# Patient Record
Sex: MALE | Race: OTHER | Employment: OTHER | URBAN - METROPOLITAN AREA
[De-identification: names, ages, dates, MRNs, and addresses within clinical notes are randomized per-mention and may not be internally consistent; named-entity substitution may affect disease eponyms.]

---

## 2017-07-31 ENCOUNTER — OFFICE VISIT (OUTPATIENT)
Dept: FAMILY MEDICINE CLINIC | Facility: CLINIC | Age: 72
End: 2017-07-31

## 2017-07-31 ENCOUNTER — TELEPHONE (OUTPATIENT)
Dept: FAMILY MEDICINE CLINIC | Facility: CLINIC | Age: 72
End: 2017-07-31

## 2017-07-31 VITALS
DIASTOLIC BLOOD PRESSURE: 75 MMHG | HEART RATE: 83 BPM | WEIGHT: 122 LBS | TEMPERATURE: 98 F | SYSTOLIC BLOOD PRESSURE: 113 MMHG

## 2017-07-31 DIAGNOSIS — R35.1 BENIGN PROSTATIC HYPERPLASIA WITH NOCTURIA: ICD-10-CM

## 2017-07-31 DIAGNOSIS — I48.20 CHRONIC ATRIAL FIBRILLATION (HCC): Primary | ICD-10-CM

## 2017-07-31 DIAGNOSIS — N40.1 BENIGN PROSTATIC HYPERPLASIA WITH NOCTURIA: ICD-10-CM

## 2017-07-31 DIAGNOSIS — M05.79 RHEUMATOID ARTHRITIS INVOLVING MULTIPLE SITES WITH POSITIVE RHEUMATOID FACTOR (HCC): ICD-10-CM

## 2017-07-31 PROCEDURE — 99204 OFFICE O/P NEW MOD 45 MIN: CPT | Performed by: FAMILY MEDICINE

## 2017-07-31 PROCEDURE — 93005 ELECTROCARDIOGRAM TRACING: CPT | Performed by: FAMILY MEDICINE

## 2017-07-31 PROCEDURE — 99212 OFFICE O/P EST SF 10 MIN: CPT | Performed by: FAMILY MEDICINE

## 2017-07-31 PROCEDURE — 93010 ELECTROCARDIOGRAM REPORT: CPT | Performed by: FAMILY MEDICINE

## 2017-07-31 RX ORDER — PREDNISONE 1 MG/1
5 TABLET ORAL DAILY
Qty: 30 TABLET | Refills: 1 | Status: SHIPPED | OUTPATIENT
Start: 2017-07-31 | End: 2017-08-01

## 2017-07-31 RX ORDER — AMIODARONE HYDROCHLORIDE 200 MG/1
200 TABLET ORAL DAILY
Qty: 90 TABLET | Refills: 1 | Status: SHIPPED | OUTPATIENT
Start: 2017-07-31 | End: 2017-11-08

## 2017-07-31 RX ORDER — TAMSULOSIN HYDROCHLORIDE 0.4 MG/1
0.4 CAPSULE ORAL DAILY
Qty: 90 CAPSULE | Refills: 1 | Status: SHIPPED | OUTPATIENT
Start: 2017-07-31 | End: 2017-08-30

## 2017-07-31 RX ORDER — AMITRIPTYLINE HYDROCHLORIDE 10 MG/1
10 TABLET, FILM COATED ORAL NIGHTLY
Qty: 90 TABLET | Refills: 1 | Status: SHIPPED | OUTPATIENT
Start: 2017-07-31 | End: 2017-11-27

## 2017-07-31 RX ORDER — FINASTERIDE 5 MG/1
5 TABLET, FILM COATED ORAL DAILY
Qty: 90 TABLET | Refills: 1 | Status: SHIPPED | OUTPATIENT
Start: 2017-07-31 | End: 2017-11-27

## 2017-07-31 NOTE — TELEPHONE ENCOUNTER
Pharm is calling state that the medication prednisoLONE 5 MG Oral Tab they don't have in generic and the brand name is very expensive  Pharm want to know if Dr Kasandra Eubanks want to change to something else

## 2017-07-31 NOTE — PROGRESS NOTES
7/31/2017  1:50 PM    Ari Carrasco is a 67year old male. Chief complaint(s): Patient presents with:  Medication Request    HPI:     Ari Carrasco primary complaint is regarding multiple issues .      In regard to the atrial fibril Patient complaince with his treatment has beengood. Patient has had no previous prostate surgery . The severity of symptoms for now remains mild. BPH symptom score: Total BPH symptom score is 8 ( 8-19 (moderate symptom score)). His last PSA was ? [employer]   Constitutional: Negative for chills, fatigue and fever. HENT: Negative for rhinorrhea and sore throat. Respiratory: Negative for cough, shortness of breath and wheezing. Cardiovascular: Negative for chest pain and palpitations.    Luther Lu atrial fibrillation (hcc)  (primary encounter diagnosis)  Rheumatoid arthritis involving multiple sites with positive rheumatoid factor (hcc)  Benign prostatic hyperplasia with nocturia    1.  Chronic atrial fibrillation [facility])  MEDICATIONS:     Signed Presc Oral Cap 90 capsule 1      Sig: Take 1 capsule (0.4 mg total) by mouth daily. finasteride 5 MG Oral Tab 90 tablet 1      Sig: Take 1 tablet (5 mg total) by mouth daily.       aspirin 81 MG Oral Tab 90 tablet 3      Sig: Take 1 tablet (81 mg total) by m (2.5 mg total) by mouth 2 (two) times daily.               LABORATORY & ORDERS:   Orders Placed This Encounter      ** Hemoglobin A1C  [E]      ** Lipid Panel [E]      **CMP  Comp Metabolic Panel (14) [E]      **CBC W Differential W Platelet [E]      PSA, T

## 2017-08-01 RX ORDER — PREDNISONE 1 MG/1
5 TABLET ORAL DAILY
Qty: 30 TABLET | Refills: 1 | Status: SHIPPED | OUTPATIENT
Start: 2017-08-01 | End: 2017-08-31

## 2017-08-01 NOTE — TELEPHONE ENCOUNTER
Contacted by Edwina Greene in [address] [place] [address], requesting change prednisolone to prednisone. Prescription was changed 7/31/17 and was sent to [facility] in error. erx sent to [facility] and they will contact [facility] to cancel the order.  Profile updated, p

## 2017-08-01 NOTE — TELEPHONE ENCOUNTER
Pharmacy calling to inquire on status. Appears to have been approved. Needs to be sent to Archana Abraham in [address] [place] [address].

## 2017-08-24 ENCOUNTER — PRIOR ORIGINAL RECORDS (OUTPATIENT)
Dept: OTHER | Age: 72
End: 2017-08-24

## 2017-08-24 ENCOUNTER — LAB ENCOUNTER (OUTPATIENT)
Dept: LAB | Facility: HOSPITAL | Age: 72
End: 2017-08-24
Attending: INTERNAL MEDICINE
Payer: MEDICARE

## 2017-08-24 DIAGNOSIS — Z82.49 FAMILY HISTORY OF EARLY CAD: ICD-10-CM

## 2017-08-24 DIAGNOSIS — I48.91 ATRIAL FIBRILLATION (HCC): Primary | ICD-10-CM

## 2017-08-24 LAB
ANION GAP SERPL CALC-SCNC: 8 MMOL/L (ref 0–18)
BASOPHILS # BLD: 0 K/UL (ref 0–0.2)
BASOPHILS NFR BLD: 0 %
BUN SERPL-MCNC: 19 MG/DL (ref 8–20)
BUN/CREAT SERPL: 15.2 (ref 10–20)
CALCIUM SERPL-MCNC: 9.1 MG/DL (ref 8.5–10.5)
CHLORIDE SERPL-SCNC: 102 MMOL/L (ref 95–110)
CHOLEST SERPL-MCNC: 176 MG/DL (ref 110–200)
CO2 SERPL-SCNC: 27 MMOL/L (ref 22–32)
CREAT SERPL-MCNC: 1.25 MG/DL (ref 0.5–1.5)
EOSINOPHIL # BLD: 0.1 K/UL (ref 0–0.7)
EOSINOPHIL NFR BLD: 2 %
ERYTHROCYTE [DISTWIDTH] IN BLOOD BY AUTOMATED COUNT: 14.5 % (ref 11–15)
GLUCOSE SERPL-MCNC: 81 MG/DL (ref 70–99)
HCT VFR BLD AUTO: 40.4 % (ref 41–52)
HDLC SERPL-MCNC: 71 MG/DL
HGB BLD-MCNC: 13.2 G/DL (ref 13.5–17.5)
LDLC SERPL CALC-MCNC: 92 MG/DL (ref 0–99)
LYMPHOCYTES # BLD: 0.8 K/UL (ref 1–4)
LYMPHOCYTES NFR BLD: 10 %
MAGNESIUM SERPL-MCNC: 2.2 MG/DL (ref 1.8–2.5)
MCH RBC QN AUTO: 31.1 PG (ref 27–32)
MCHC RBC AUTO-ENTMCNC: 32.7 G/DL (ref 32–37)
MCV RBC AUTO: 94.9 FL (ref 80–100)
MONOCYTES # BLD: 0.7 K/UL (ref 0–1)
MONOCYTES NFR BLD: 9 %
NEUTROPHILS # BLD AUTO: 6.1 K/UL (ref 1.8–7.7)
NEUTROPHILS NFR BLD: 79 %
NONHDLC SERPL-MCNC: 105 MG/DL
OSMOLALITY UR CALC.SUM OF ELEC: 285 MOSM/KG (ref 275–295)
PLATELET # BLD AUTO: 172 K/UL (ref 140–400)
PMV BLD AUTO: 8.4 FL (ref 7.4–10.3)
POTASSIUM SERPL-SCNC: 4.1 MMOL/L (ref 3.3–5.1)
RBC # BLD AUTO: 4.26 M/UL (ref 4.5–5.9)
SODIUM SERPL-SCNC: 137 MMOL/L (ref 136–144)
TRIGL SERPL-MCNC: 66 MG/DL (ref 1–149)
TSH SERPL-ACNC: 1.5 UIU/ML (ref 0.45–5.33)
WBC # BLD AUTO: 7.7 K/UL (ref 4–11)

## 2017-08-24 PROCEDURE — 80048 BASIC METABOLIC PNL TOTAL CA: CPT

## 2017-08-24 PROCEDURE — 85025 COMPLETE CBC W/AUTO DIFF WBC: CPT

## 2017-08-24 PROCEDURE — 36415 COLL VENOUS BLD VENIPUNCTURE: CPT

## 2017-08-24 PROCEDURE — 84443 ASSAY THYROID STIM HORMONE: CPT

## 2017-08-24 PROCEDURE — 83735 ASSAY OF MAGNESIUM: CPT

## 2017-08-24 PROCEDURE — 80061 LIPID PANEL: CPT

## 2017-08-30 ENCOUNTER — PRIOR ORIGINAL RECORDS (OUTPATIENT)
Dept: OTHER | Age: 72
End: 2017-08-30

## 2017-08-31 ENCOUNTER — OFFICE VISIT (OUTPATIENT)
Dept: FAMILY MEDICINE CLINIC | Facility: CLINIC | Age: 72
End: 2017-08-31

## 2017-08-31 VITALS
HEART RATE: 91 BPM | DIASTOLIC BLOOD PRESSURE: 61 MMHG | SYSTOLIC BLOOD PRESSURE: 96 MMHG | TEMPERATURE: 98 F | HEIGHT: 66 IN | BODY MASS INDEX: 20.03 KG/M2 | WEIGHT: 124.63 LBS

## 2017-08-31 DIAGNOSIS — M05.79 RHEUMATOID ARTHRITIS INVOLVING MULTIPLE SITES WITH POSITIVE RHEUMATOID FACTOR (HCC): ICD-10-CM

## 2017-08-31 DIAGNOSIS — I48.20 CHRONIC ATRIAL FIBRILLATION (HCC): Primary | ICD-10-CM

## 2017-08-31 DIAGNOSIS — M43.6 TORTICOLLIS, ACUTE: ICD-10-CM

## 2017-08-31 PROCEDURE — G0463 HOSPITAL OUTPT CLINIC VISIT: HCPCS | Performed by: FAMILY MEDICINE

## 2017-08-31 PROCEDURE — 99214 OFFICE O/P EST MOD 30 MIN: CPT | Performed by: FAMILY MEDICINE

## 2017-08-31 RX ORDER — METAXALONE 800 MG/1
800 TABLET ORAL 3 TIMES DAILY
Qty: 30 TABLET | Refills: 1 | Status: SHIPPED | OUTPATIENT
Start: 2017-08-31 | End: 2017-10-16

## 2017-08-31 RX ORDER — TAMSULOSIN HYDROCHLORIDE 0.4 MG/1
0.4 CAPSULE ORAL DAILY
COMMUNITY
End: 2017-08-31

## 2017-08-31 RX ORDER — TAMSULOSIN HYDROCHLORIDE 0.4 MG/1
0.4 CAPSULE ORAL DAILY
Qty: 60 CAPSULE | Refills: 4 | Status: SHIPPED | OUTPATIENT
Start: 2017-08-31

## 2017-08-31 RX ORDER — PREDNISONE 1 MG/1
5 TABLET ORAL DAILY
Qty: 30 TABLET | Refills: 1 | Status: SHIPPED | OUTPATIENT
Start: 2017-08-31 | End: 2017-11-27

## 2017-08-31 NOTE — PROGRESS NOTES
8/31/2017  11:04 AM    Shahla Stoll is a 67year old male. Chief complaint(s): Patient presents with:   Follow - Up: Patient here to f/u from his last visit regarding his Afib    HPI:     Nato [place] primary complaint is regardin Medical History:   Diagnosis Date   • Atrial fibrillation (HCC)    • BPH (benign prostatic hyperplasia)    • Diverticulosis    • Rheumatoid arteritis       Past Surgical History:  No date: INTESTINE SURG PROCEDURE UNLISTED   Family History   Problem Relati Musculoskeletal: Positive for joint swelling, joint pain, neck pain and neck stiffness. Negative for back pain. Skin: Negative for rash. Neurological: Negative for seizures and headaches. Psychiatric/Behavioral: Negative for depressed mood.        P 1. 50 0.45 - 5.33 uIU/mL   -LIPID PANEL   Result Value Ref Range   HDL Cholesterol 71 mg/dL   Cholesterol, Total 176 110 - 200 mg/dL   Triglycerides 66 1 - 149 mg/dL   Non HDL Chol 105 <130 mg/dL   LDL Cholesterol 92 0 - 99 mg/dL   -CBC W/ DIFFERENTIAL   Re mg total) by mouth daily. Metaxalone (SKELAXIN) 800 MG Oral Tab 30 tablet 1      Sig: Take 1 tablet (800 mg total) by mouth 3 (three) times daily. REFERRALS: 1RHEUMATOLOGY - INTERNAL,       Rheumatology Referral - [facility]).

## 2017-09-11 ENCOUNTER — PRIOR ORIGINAL RECORDS (OUTPATIENT)
Dept: OTHER | Age: 72
End: 2017-09-11

## 2017-09-20 ENCOUNTER — HOSPITAL ENCOUNTER (OUTPATIENT)
Dept: GENERAL RADIOLOGY | Age: 72
Discharge: HOME | End: 2017-09-20
Attending: INTERNAL MEDICINE
Payer: MEDICARE

## 2017-09-20 ENCOUNTER — LAB ENCOUNTER (OUTPATIENT)
Dept: LAB | Age: 72
End: 2017-09-20
Attending: INTERNAL MEDICINE
Payer: MEDICARE

## 2017-09-20 ENCOUNTER — OFFICE VISIT (OUTPATIENT)
Dept: RHEUMATOLOGY | Facility: CLINIC | Age: 72
End: 2017-09-20

## 2017-09-20 ENCOUNTER — PRIOR ORIGINAL RECORDS (OUTPATIENT)
Dept: OTHER | Age: 72
End: 2017-09-20

## 2017-09-20 ENCOUNTER — HOSPITAL ENCOUNTER (OUTPATIENT)
Dept: GENERAL RADIOLOGY | Age: 72
Discharge: HOME | End: 2017-09-20
Attending: INTERNAL MEDICINE | Admitting: INTERNAL MEDICINE
Payer: MEDICARE

## 2017-09-20 VITALS
SYSTOLIC BLOOD PRESSURE: 97 MMHG | HEART RATE: 102 BPM | BODY MASS INDEX: 19.67 KG/M2 | HEIGHT: 66 IN | DIASTOLIC BLOOD PRESSURE: 62 MMHG | WEIGHT: 122.38 LBS

## 2017-09-20 DIAGNOSIS — M06.9 RHEUMATOID ARTHRITIS INVOLVING MULTIPLE SITES, UNSPECIFIED RHEUMATOID FACTOR PRESENCE: ICD-10-CM

## 2017-09-20 DIAGNOSIS — R35.1 BENIGN PROSTATIC HYPERPLASIA WITH NOCTURIA: ICD-10-CM

## 2017-09-20 DIAGNOSIS — R06.00 DYSPNEA ON EXERTION: ICD-10-CM

## 2017-09-20 DIAGNOSIS — I48.20 CHRONIC ATRIAL FIBRILLATION (HCC): ICD-10-CM

## 2017-09-20 DIAGNOSIS — M05.79 RHEUMATOID ARTHRITIS INVOLVING MULTIPLE SITES WITH POSITIVE RHEUMATOID FACTOR (HCC): ICD-10-CM

## 2017-09-20 DIAGNOSIS — N40.1 BENIGN PROSTATIC HYPERPLASIA WITH NOCTURIA: ICD-10-CM

## 2017-09-20 DIAGNOSIS — M06.9 RHEUMATOID ARTHRITIS INVOLVING MULTIPLE SITES, UNSPECIFIED RHEUMATOID FACTOR PRESENCE: Primary | ICD-10-CM

## 2017-09-20 LAB
ALBUMIN SERPL BCP-MCNC: 3.5 G/DL (ref 3.5–4.8)
ALBUMIN/GLOB SERPL: 1.1 {RATIO} (ref 1–2)
ALP SERPL-CCNC: 59 U/L (ref 32–100)
ALT SERPL-CCNC: 10 U/L (ref 17–63)
ANION GAP SERPL CALC-SCNC: 7 MMOL/L (ref 0–18)
AST SERPL-CCNC: 14 U/L (ref 15–41)
BASOPHILS # BLD: 0 K/UL (ref 0–0.2)
BASOPHILS NFR BLD: 1 %
BILIRUB SERPL-MCNC: 0.6 MG/DL (ref 0.3–1.2)
BUN SERPL-MCNC: 19 MG/DL (ref 8–20)
BUN/CREAT SERPL: 14.2 (ref 10–20)
CALCIUM SERPL-MCNC: 8.9 MG/DL (ref 8.5–10.5)
CHLORIDE SERPL-SCNC: 103 MMOL/L (ref 95–110)
CHOLEST SERPL-MCNC: 153 MG/DL (ref 110–200)
CO2 SERPL-SCNC: 28 MMOL/L (ref 22–32)
CREAT SERPL-MCNC: 1.34 MG/DL (ref 0.5–1.5)
CRP SERPL-MCNC: 0.5 MG/DL (ref 0–0.9)
EOSINOPHIL # BLD: 0.1 K/UL (ref 0–0.7)
EOSINOPHIL NFR BLD: 2 %
ERYTHROCYTE [DISTWIDTH] IN BLOOD BY AUTOMATED COUNT: 14.9 % (ref 11–15)
ERYTHROCYTE [SEDIMENTATION RATE] IN BLOOD: 13 MM/HR (ref 0–20)
GLOBULIN PLAS-MCNC: 3.1 G/DL (ref 2.5–3.7)
GLUCOSE SERPL-MCNC: 79 MG/DL (ref 70–99)
HBA1C MFR BLD: 5.4 % (ref 4–6)
HCT VFR BLD AUTO: 40.8 % (ref 41–52)
HDLC SERPL-MCNC: 71 MG/DL
HGB BLD-MCNC: 13.4 G/DL (ref 13.5–17.5)
LDLC SERPL CALC-MCNC: 67 MG/DL (ref 0–99)
LYMPHOCYTES # BLD: 0.9 K/UL (ref 1–4)
LYMPHOCYTES NFR BLD: 15 %
MCH RBC QN AUTO: 31.1 PG (ref 27–32)
MCHC RBC AUTO-ENTMCNC: 32.8 G/DL (ref 32–37)
MCV RBC AUTO: 95 FL (ref 80–100)
MONOCYTES # BLD: 0.6 K/UL (ref 0–1)
MONOCYTES NFR BLD: 10 %
NEUTROPHILS # BLD AUTO: 4.3 K/UL (ref 1.8–7.7)
NEUTROPHILS NFR BLD: 74 %
NONHDLC SERPL-MCNC: 82 MG/DL
OSMOLALITY UR CALC.SUM OF ELEC: 287 MOSM/KG (ref 275–295)
PLATELET # BLD AUTO: 210 K/UL (ref 140–400)
PMV BLD AUTO: 8.3 FL (ref 7.4–10.3)
POTASSIUM SERPL-SCNC: 3.8 MMOL/L (ref 3.3–5.1)
PROT SERPL-MCNC: 6.6 G/DL (ref 5.9–8.4)
PSA SERPL-MCNC: 0.4 NG/ML (ref 0–4)
RBC # BLD AUTO: 4.3 M/UL (ref 4.5–5.9)
RHEUMATOID FACT SER QL: 59 IU/ML
SODIUM SERPL-SCNC: 138 MMOL/L (ref 136–144)
TRIGL SERPL-MCNC: 74 MG/DL (ref 1–149)
WBC # BLD AUTO: 5.9 K/UL (ref 4–11)

## 2017-09-20 PROCEDURE — G0463 HOSPITAL OUTPT CLINIC VISIT: HCPCS | Performed by: INTERNAL MEDICINE

## 2017-09-20 PROCEDURE — 80061 LIPID PANEL: CPT

## 2017-09-20 PROCEDURE — 73130 X-RAY EXAM OF HAND: CPT | Performed by: INTERNAL MEDICINE

## 2017-09-20 PROCEDURE — 84153 ASSAY OF PSA TOTAL: CPT

## 2017-09-20 PROCEDURE — 36415 COLL VENOUS BLD VENIPUNCTURE: CPT

## 2017-09-20 PROCEDURE — 83036 HEMOGLOBIN GLYCOSYLATED A1C: CPT

## 2017-09-20 PROCEDURE — 80053 COMPREHEN METABOLIC PANEL: CPT

## 2017-09-20 PROCEDURE — 86431 RHEUMATOID FACTOR QUANT: CPT

## 2017-09-20 PROCEDURE — 85025 COMPLETE CBC W/AUTO DIFF WBC: CPT

## 2017-09-20 PROCEDURE — 73630 X-RAY EXAM OF FOOT: CPT | Performed by: INTERNAL MEDICINE

## 2017-09-20 PROCEDURE — 99204 OFFICE O/P NEW MOD 45 MIN: CPT | Performed by: INTERNAL MEDICINE

## 2017-09-20 PROCEDURE — 86200 CCP ANTIBODY: CPT

## 2017-09-20 PROCEDURE — 86140 C-REACTIVE PROTEIN: CPT

## 2017-09-20 PROCEDURE — 71020 XR CHEST PA + LAT CHEST (CPT=71020): CPT | Performed by: INTERNAL MEDICINE

## 2017-09-20 PROCEDURE — 85652 RBC SED RATE AUTOMATED: CPT

## 2017-09-20 RX ORDER — MIDODRINE HYDROCHLORIDE 5 MG/1
5 TABLET ORAL 3 TIMES DAILY
COMMUNITY

## 2017-09-20 NOTE — PROGRESS NOTES
Dear Dr. Sea Daly:    I saw your patient Isauro Siddiqi in consultation this morning in my rheumatology clinic at your request for evaluation of rheumatoid arthritis.   As you know, he is a 70-year-old gentleman from [facility] who developed rheumatoi with walking. No chest pain. He has acid reflux. No stomach pain, nausea or vomiting, constipation or diarrhea, blood in his stools. No trouble urinating. No dry eyes but he does have dry mouth. No Raynaud's. He gets occasional headaches.     Physica will need to remain off NSAIDS. Thank you for inviting me to participate in his care. Sincerely,      Quinn Tarango MD   Rheumatology.

## 2017-09-22 LAB — CCP IGG SERPL-ACNC: 108 U/ML (ref 0–6.9)

## 2017-09-27 ENCOUNTER — MYAURORA ACCOUNT LINK (OUTPATIENT)
Dept: OTHER | Age: 72
End: 2017-09-27

## 2017-09-27 ENCOUNTER — PRIOR ORIGINAL RECORDS (OUTPATIENT)
Dept: OTHER | Age: 72
End: 2017-09-27

## 2017-09-27 LAB
ALBUMIN: 3.5 G/DL
ALKALINE PHOSPHATATE(ALK PHOS): 59 IU/L
ALT (SGPT): 10 U/L
AST (SGOT): 14 U/L
BILIRUBIN TOTAL: 0.6 MG/DL
BUN: 19 MG/DL
CALCIUM: 8.9 MG/DL
CHLORIDE: 103 MEQ/L
CHOLESTEROL, TOTAL: 153 MG/DL
CREATININE, SERUM: 1.34 MG/DL
GLOBULIN: 3.1 G/DL
GLUCOSE: 79 MG/DL
GLUCOSE: 79 MG/DL
HDL CHOLESTEROL: 71 MG/DL
HEMATOCRIT: 40.8 %
HEMOGLOBIN A1C: 5.4 %
HEMOGLOBIN: 13.4 G/DL
LDL CHOLESTEROL: 67 MG/DL
NON-HDL CHOLESTEROL: 82 MG/DL
PLATELETS: 210 K/UL
POTASSIUM, SERUM: 3.8 MEQ/L
PROTEIN, TOTAL: 6.6 G/DL
RED BLOOD COUNT: 4.3 X 10-6/U
SGOT (AST): 14 IU/L
SGPT (ALT): 10 IU/L
SODIUM: 138 MEQ/L
TRIGLYCERIDES: 74 MG/DL
WHITE BLOOD COUNT: 5.9 X 10-3/U

## 2017-09-28 ENCOUNTER — OFFICE VISIT (OUTPATIENT)
Dept: FAMILY MEDICINE CLINIC | Facility: CLINIC | Age: 72
End: 2017-09-28

## 2017-09-28 VITALS
BODY MASS INDEX: 20.19 KG/M2 | SYSTOLIC BLOOD PRESSURE: 90 MMHG | WEIGHT: 125.63 LBS | TEMPERATURE: 98 F | HEART RATE: 102 BPM | DIASTOLIC BLOOD PRESSURE: 54 MMHG | HEIGHT: 66 IN

## 2017-09-28 DIAGNOSIS — Z23 INFLUENZA VACCINATION ADMINISTERED AT CURRENT VISIT: ICD-10-CM

## 2017-09-28 DIAGNOSIS — M05.79 RHEUMATOID ARTHRITIS INVOLVING MULTIPLE SITES WITH POSITIVE RHEUMATOID FACTOR (HCC): ICD-10-CM

## 2017-09-28 DIAGNOSIS — I48.20 CHRONIC ATRIAL FIBRILLATION (HCC): Primary | ICD-10-CM

## 2017-09-28 PROCEDURE — G0008 ADMIN INFLUENZA VIRUS VAC: HCPCS | Performed by: FAMILY MEDICINE

## 2017-09-28 PROCEDURE — G0463 HOSPITAL OUTPT CLINIC VISIT: HCPCS | Performed by: FAMILY MEDICINE

## 2017-09-28 PROCEDURE — 90653 IIV ADJUVANT VACCINE IM: CPT | Performed by: FAMILY MEDICINE

## 2017-09-28 PROCEDURE — 99214 OFFICE O/P EST MOD 30 MIN: CPT | Performed by: FAMILY MEDICINE

## 2017-09-28 NOTE — PROGRESS NOTES
9/28/2017  10:09 AM    Larry Irizarry is a 67year old male. Chief complaint(s): Patient presents with: Follow - Up: Patient here to f/u on his RA, Afib and is c/o of neck pain.     HPI:     Larry Irizarry primary complaint is regard medications. Support groups include Rheumatologist and his family.     HISTORY:  Past Medical History:   Diagnosis Date   • Atrial fibrillation ([facility] [address]    • BPH (benign prostatic hyperplasia)    • Diverticulosis    • Rheumatoid arteritis       Past Surgical H abdominal pain, diarrhea and constipation. Musculoskeletal: Positive for joint pain. Negative for back pain and neck pain. Skin: Negative for rash. Neurological: Negative for seizures and headaches.    Psychiatric/Behavioral: Negative for depressed mo U/L   Alkaline Phosphatase 59 32 - 100 U/L   Bilirubin, Total 0.6 0.3 - 1.2 mg/dL   Total Protein 6.6 5.9 - 8.4 g/dL   Albumin 3.5 3.5 - 4.8 g/dL   Globulin 3.1 2.5 - 3.7 g/dL   A/G Ratio 1.1 1.0 - 2.0   Anion Gap 7 0 - 18 mmol/L   BUN/CREA Ratio 14.2 10. 0 visible mass or adenopathy. 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 Lungs clear. No pleural effusion. BONES: No fracture or visible bony lesion. OTHER: Negative.    Dictated by (CST): Gopal Hyde MD on 9/20/2017 at 10:54 hindfoot or midfoot. OTHER: Bone mineralization normal. Atherosclerotic vascular calcification bilaterally. Dictated by (CST): Nick Huertas MD on 9/20/2017 at 10:56     Approved by (CST): Nick Huertas MD on 9/20/2017 at 11:16          CONCLUSION:  1. narrowing of the PIP joint of index finger. Tiny cyst or erosion along the radial margin of middle phalangeal head at the DIP joints of index and long fingers. Mild narrowing of DIP joint of long finger, and first Aia 16 joint.   Right: Marked narrowing of the tablet, Rfl: 1  •  tamsulosin HCl 0.4 MG Oral Cap, Take 1 capsule (0.4 mg total) by mouth daily. , Disp: 60 capsule, Rfl: 4  •  amiodarone HCl 200 MG Oral Tab, Take 1 tablet (200 mg total) by mouth daily. , Disp: 90 tablet, Rfl: 1  •  finasteride 5 MG Oral T

## 2017-10-04 ENCOUNTER — OFFICE VISIT (OUTPATIENT)
Dept: RHEUMATOLOGY | Facility: CLINIC | Age: 72
End: 2017-10-04

## 2017-10-04 VITALS
HEART RATE: 96 BPM | BODY MASS INDEX: 20.76 KG/M2 | WEIGHT: 129.19 LBS | DIASTOLIC BLOOD PRESSURE: 65 MMHG | HEIGHT: 66 IN | SYSTOLIC BLOOD PRESSURE: 99 MMHG

## 2017-10-04 DIAGNOSIS — Z51.81 ENCOUNTER FOR THERAPEUTIC DRUG MONITORING: ICD-10-CM

## 2017-10-04 DIAGNOSIS — M05.79 RHEUMATOID ARTHRITIS INVOLVING MULTIPLE SITES WITH POSITIVE RHEUMATOID FACTOR (HCC): Primary | ICD-10-CM

## 2017-10-04 PROCEDURE — G0463 HOSPITAL OUTPT CLINIC VISIT: HCPCS | Performed by: INTERNAL MEDICINE

## 2017-10-04 PROCEDURE — 99213 OFFICE O/P EST LOW 20 MIN: CPT | Performed by: INTERNAL MEDICINE

## 2017-10-04 RX ORDER — FOLIC ACID 1 MG/1
TABLET ORAL
Qty: 30 TABLET | Refills: 11 | Status: SHIPPED | OUTPATIENT
Start: 2017-10-04

## 2017-10-04 NOTE — PROGRESS NOTES
Dear Dr. Alison Goldman:    I saw Negro Morgan in follow-up this afternoon in my rheumatology clinic. He denies having pain. We reviewed his x-rays on the computer. His chest x-ray is unremarkable. No evidence of interstitial lung disease.   Sergey

## 2017-10-16 ENCOUNTER — PRIOR ORIGINAL RECORDS (OUTPATIENT)
Dept: OTHER | Age: 72
End: 2017-10-16

## 2017-10-16 ENCOUNTER — MYAURORA ACCOUNT LINK (OUTPATIENT)
Dept: OTHER | Age: 72
End: 2017-10-16

## 2017-10-16 RX ORDER — METAXALONE 800 MG/1
800 TABLET ORAL 3 TIMES DAILY
Qty: 30 TABLET | Refills: 1 | Status: SHIPPED | OUTPATIENT
Start: 2017-10-16 | End: 2017-11-05

## 2017-10-16 NOTE — TELEPHONE ENCOUNTER
Patient's son called in to request a refill for the medication listed below. Please advise.      Metaxalone (SKELAXIN) 800 MG Oral Tab

## 2017-10-19 NOTE — TELEPHONE ENCOUNTER
JOON stts pt calling regarding medication, informed JOON that script was sent 3 days ago to [facility], pt should call pharmacy

## 2017-11-08 ENCOUNTER — LAB ENCOUNTER (OUTPATIENT)
Dept: LAB | Age: 72
End: 2017-11-08
Attending: INTERNAL MEDICINE
Payer: MEDICARE

## 2017-11-08 ENCOUNTER — OFFICE VISIT (OUTPATIENT)
Dept: RHEUMATOLOGY | Facility: CLINIC | Age: 72
End: 2017-11-08

## 2017-11-08 VITALS
HEIGHT: 66 IN | BODY MASS INDEX: 20.99 KG/M2 | WEIGHT: 130.63 LBS | DIASTOLIC BLOOD PRESSURE: 65 MMHG | SYSTOLIC BLOOD PRESSURE: 95 MMHG | HEART RATE: 105 BPM

## 2017-11-08 DIAGNOSIS — M05.79 RHEUMATOID ARTHRITIS INVOLVING MULTIPLE SITES WITH POSITIVE RHEUMATOID FACTOR (HCC): ICD-10-CM

## 2017-11-08 DIAGNOSIS — M05.79 RHEUMATOID ARTHRITIS INVOLVING MULTIPLE SITES WITH POSITIVE RHEUMATOID FACTOR (HCC): Primary | ICD-10-CM

## 2017-11-08 DIAGNOSIS — Z51.81 ENCOUNTER FOR THERAPEUTIC DRUG MONITORING: ICD-10-CM

## 2017-11-08 PROCEDURE — 36415 COLL VENOUS BLD VENIPUNCTURE: CPT

## 2017-11-08 PROCEDURE — G0463 HOSPITAL OUTPT CLINIC VISIT: HCPCS | Performed by: INTERNAL MEDICINE

## 2017-11-08 PROCEDURE — 85652 RBC SED RATE AUTOMATED: CPT

## 2017-11-08 PROCEDURE — 84450 TRANSFERASE (AST) (SGOT): CPT

## 2017-11-08 PROCEDURE — 99214 OFFICE O/P EST MOD 30 MIN: CPT | Performed by: INTERNAL MEDICINE

## 2017-11-08 PROCEDURE — 85025 COMPLETE CBC W/AUTO DIFF WBC: CPT

## 2017-11-08 PROCEDURE — 86140 C-REACTIVE PROTEIN: CPT

## 2017-11-08 PROCEDURE — 82565 ASSAY OF CREATININE: CPT

## 2017-11-08 PROCEDURE — 82040 ASSAY OF SERUM ALBUMIN: CPT

## 2017-11-08 NOTE — PROGRESS NOTES
HPI:    Patient ID: Larry Irizarry is a 67year old male. 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     Since I saw him October 4th, 2017, he has been taking 10 mg of methotrexate weekly, wi Normocephalic. Eyes: Conjunctivae are normal.   Cardiovascular: Normal rate, regular rhythm and normal heart sounds. Pulmonary/Chest: Effort normal and breath sounds normal.   Abdominal: Soft. Bowel sounds are normal. He exhibits no mass.  There is no

## 2017-11-17 ENCOUNTER — TELEPHONE (OUTPATIENT)
Dept: FAMILY MEDICINE CLINIC | Facility: CLINIC | Age: 72
End: 2017-11-17

## 2017-11-17 RX ORDER — TIZANIDINE HYDROCHLORIDE 2 MG/1
2 CAPSULE, GELATIN COATED ORAL 3 TIMES DAILY
Qty: 40 CAPSULE | Refills: 0 | Status: SHIPPED | OUTPATIENT
Start: 2017-11-17 | End: 2017-11-20

## 2017-11-17 NOTE — TELEPHONE ENCOUNTER
[facility] - [facility] SU[facility] [place] for   Please see pharmacy message below and advise.  Thanks

## 2017-11-17 NOTE — TELEPHONE ENCOUNTER
Jacquelyn from pharmacy is calling to inform dr that pt is there now for his refill on his medication but states that insurance does not cover this medication. They are wondering if DR can change it to tizanidine. pls advise. Thank you.       Current Outpatient P

## 2017-11-18 RX ORDER — TIZANIDINE 2 MG/1
2 TABLET ORAL 3 TIMES DAILY
Qty: 40 TABLET | Refills: 0 | Status: SHIPPED | OUTPATIENT
Start: 2017-11-18 | End: 2017-11-27

## 2017-11-27 ENCOUNTER — OFFICE VISIT (OUTPATIENT)
Dept: FAMILY MEDICINE CLINIC | Facility: CLINIC | Age: 72
End: 2017-11-27

## 2017-11-27 VITALS
HEART RATE: 98 BPM | DIASTOLIC BLOOD PRESSURE: 72 MMHG | TEMPERATURE: 98 F | WEIGHT: 131 LBS | SYSTOLIC BLOOD PRESSURE: 109 MMHG | BODY MASS INDEX: 21 KG/M2

## 2017-11-27 DIAGNOSIS — M05.79 RHEUMATOID ARTHRITIS INVOLVING MULTIPLE SITES WITH POSITIVE RHEUMATOID FACTOR (HCC): ICD-10-CM

## 2017-11-27 DIAGNOSIS — R35.1 BENIGN PROSTATIC HYPERPLASIA WITH NOCTURIA: ICD-10-CM

## 2017-11-27 DIAGNOSIS — I48.20 CHRONIC ATRIAL FIBRILLATION (HCC): Primary | ICD-10-CM

## 2017-11-27 DIAGNOSIS — N40.1 BENIGN PROSTATIC HYPERPLASIA WITH NOCTURIA: ICD-10-CM

## 2017-11-27 PROCEDURE — G0463 HOSPITAL OUTPT CLINIC VISIT: HCPCS | Performed by: FAMILY MEDICINE

## 2017-11-27 PROCEDURE — 99214 OFFICE O/P EST MOD 30 MIN: CPT | Performed by: FAMILY MEDICINE

## 2017-11-27 RX ORDER — PREDNISONE 1 MG/1
5 TABLET ORAL DAILY
Qty: 30 TABLET | Refills: 1 | Status: SHIPPED | OUTPATIENT
Start: 2017-11-27 | End: 2017-12-05

## 2017-11-27 RX ORDER — MIDODRINE HYDROCHLORIDE 5 MG/1
5 TABLET ORAL 3 TIMES DAILY
Refills: 0 | Status: CANCELLED | OUTPATIENT
Start: 2017-11-27

## 2017-11-27 RX ORDER — FINASTERIDE 5 MG/1
5 TABLET, FILM COATED ORAL DAILY
Qty: 90 TABLET | Refills: 1 | Status: SHIPPED | OUTPATIENT
Start: 2017-11-27

## 2017-11-27 RX ORDER — TIZANIDINE 2 MG/1
2 TABLET ORAL 3 TIMES DAILY
Qty: 40 TABLET | Refills: 0 | Status: CANCELLED | OUTPATIENT
Start: 2017-11-27

## 2017-11-27 NOTE — PROGRESS NOTES
11/27/2017  11:07 AM    Josh Wilson is a 67year old male. Chief complaint(s): Patient presents with: Follow - Up  Arthritis  Prostate Problem    HPI:     Nato [place] primary complaint is regarding as above.      In regard to t Rheumatologist and his family. Consuelo Johnson presents with a diagnosis of Benign prostate hyperplasia. This was diagnosed more than 3 years ago. The course has been progressively remained unchanged. Patient is on Flomax and Finasteride.   P Disp: 60 capsule Rfl: 4   aspirin 81 MG Oral Tab Take 1 tablet (81 mg total) by mouth daily. Disp: 90 tablet Rfl: 3       Allergies:  No Known Allergies      ROS:   Review of Systems   Constitutional: Negative for chills, fatigue and fever.    HENT: Negativ SERUM   Result Value Ref Range   Creatinine 1.14 0.50 - 1.50 mg/dL   GFR, Non-African American >60 >=60   GFR, -American >60 >=60   -SED RATE, WESTERGREN (AUTOMATED)   Result Value Ref Range   Sed Rate 12 0 - 20 mm/Hr   -CBC W/ DIFFERENTIAL   Result deterioration or worsening of the medical condition. Also, inform the doctor with any new symptoms or medications' side effects. FOLLOW-UP: Schedule a follow-up visit in 3 months.         3. Benign prostatic hyperplasia with nocturia    MEDICATIONS:

## 2017-12-04 NOTE — PROGRESS NOTES
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 Since I saw him November 8th, 2017, he has been up to 15 mg of methotrexate weekly, with folate supplementation.   Through his daughter who is interpretingoc oriented to person, place, and time. He appears well-developed and well-nourished. HENT:   Head: Normocephalic. Eyes: Conjunctivae are normal.   Cardiovascular: Normal rate, regular rhythm and normal heart sounds.     Pulmonary/Chest: Effort normal and

## 2017-12-05 ENCOUNTER — LAB ENCOUNTER (OUTPATIENT)
Dept: LAB | Age: 72
End: 2017-12-05
Attending: INTERNAL MEDICINE
Payer: MEDICARE

## 2017-12-05 ENCOUNTER — OFFICE VISIT (OUTPATIENT)
Dept: FAMILY MEDICINE CLINIC | Facility: CLINIC | Age: 72
End: 2017-12-05

## 2017-12-05 ENCOUNTER — OFFICE VISIT (OUTPATIENT)
Dept: RHEUMATOLOGY | Facility: CLINIC | Age: 72
End: 2017-12-05

## 2017-12-05 VITALS
DIASTOLIC BLOOD PRESSURE: 52 MMHG | HEIGHT: 66 IN | SYSTOLIC BLOOD PRESSURE: 85 MMHG | HEART RATE: 102 BPM | BODY MASS INDEX: 21.24 KG/M2 | WEIGHT: 132.19 LBS

## 2017-12-05 VITALS
BODY MASS INDEX: 22 KG/M2 | DIASTOLIC BLOOD PRESSURE: 88 MMHG | TEMPERATURE: 98 F | WEIGHT: 134 LBS | HEART RATE: 132 BPM | SYSTOLIC BLOOD PRESSURE: 123 MMHG

## 2017-12-05 DIAGNOSIS — M05.79 RHEUMATOID ARTHRITIS INVOLVING MULTIPLE SITES WITH POSITIVE RHEUMATOID FACTOR (HCC): Primary | ICD-10-CM

## 2017-12-05 DIAGNOSIS — Z51.81 ENCOUNTER FOR THERAPEUTIC DRUG MONITORING: ICD-10-CM

## 2017-12-05 DIAGNOSIS — K40.90 NON-RECURRENT UNILATERAL INGUINAL HERNIA WITHOUT OBSTRUCTION OR GANGRENE: Primary | ICD-10-CM

## 2017-12-05 DIAGNOSIS — M05.79 RHEUMATOID ARTHRITIS INVOLVING MULTIPLE SITES WITH POSITIVE RHEUMATOID FACTOR (HCC): ICD-10-CM

## 2017-12-05 PROCEDURE — 82565 ASSAY OF CREATININE: CPT

## 2017-12-05 PROCEDURE — 84450 TRANSFERASE (AST) (SGOT): CPT

## 2017-12-05 PROCEDURE — 85025 COMPLETE CBC W/AUTO DIFF WBC: CPT

## 2017-12-05 PROCEDURE — 99214 OFFICE O/P EST MOD 30 MIN: CPT | Performed by: INTERNAL MEDICINE

## 2017-12-05 PROCEDURE — 85652 RBC SED RATE AUTOMATED: CPT

## 2017-12-05 PROCEDURE — 82040 ASSAY OF SERUM ALBUMIN: CPT

## 2017-12-05 PROCEDURE — G0463 HOSPITAL OUTPT CLINIC VISIT: HCPCS | Performed by: FAMILY MEDICINE

## 2017-12-05 PROCEDURE — 99214 OFFICE O/P EST MOD 30 MIN: CPT | Performed by: FAMILY MEDICINE

## 2017-12-05 PROCEDURE — 36415 COLL VENOUS BLD VENIPUNCTURE: CPT

## 2017-12-05 PROCEDURE — 86140 C-REACTIVE PROTEIN: CPT

## 2017-12-05 PROCEDURE — G0463 HOSPITAL OUTPT CLINIC VISIT: HCPCS | Performed by: INTERNAL MEDICINE

## 2017-12-05 RX ORDER — PREDNISONE 1 MG/1
5 TABLET ORAL DAILY
Qty: 30 TABLET | Refills: 1 | Status: SHIPPED | OUTPATIENT
Start: 2017-12-05

## 2017-12-05 NOTE — PROGRESS NOTES
12/5/2017  9:46 AM    Royal Zepeda is a 67year old male. Chief complaint(s): Patient presents with:  Mass: pt c/o small mass by inguneal area x 2 days    HPI:     Nato [place] primary complaint is regarding inguinal pain.      Pa capsule Rfl: 4   aspirin 81 MG Oral Tab Take 1 tablet (81 mg total) by mouth daily. Disp: 90 tablet Rfl: 3       Allergies:  No Known Allergies      ROS:   Review of Systems   Constitutional: Negative for chills, fatigue and fever.    HENT: Negative for rhi Result Value Ref Range   C-Reactive Protein 0.7 0.0 - 0.9 mg/dL   -CREATININE, SERUM   Result Value Ref Range   Creatinine 1.14 0.50 - 1.50 mg/dL   GFR, Non-African American >60 >=60   GFR, -American >60 >=60   -SED RATE, REGREN (AUTOMATED)

## 2017-12-07 PROBLEM — R10.32 LEFT INGUINAL PAIN: Status: ACTIVE | Noted: 2017-12-07

## 2017-12-07 PROBLEM — Q53.10 UNDESCENDED LEFT TESTICLE: Status: ACTIVE | Noted: 2017-12-07

## 2017-12-08 ENCOUNTER — HOSPITAL ENCOUNTER (OUTPATIENT)
Dept: CT IMAGING | Facility: HOSPITAL | Age: 72
Discharge: HOME | End: 2017-12-08
Attending: SURGERY
Payer: MEDICARE

## 2017-12-08 DIAGNOSIS — Q53.10 UNDESCENDED LEFT TESTICLE: ICD-10-CM

## 2017-12-08 DIAGNOSIS — R10.32 LEFT INGUINAL PAIN: ICD-10-CM

## 2017-12-08 PROCEDURE — 82565 ASSAY OF CREATININE: CPT

## 2017-12-08 PROCEDURE — 72193 CT PELVIS W/DYE: CPT | Performed by: SURGERY

## 2019-02-28 VITALS
OXYGEN SATURATION: 96 % | HEIGHT: 65 IN | BODY MASS INDEX: 20.99 KG/M2 | SYSTOLIC BLOOD PRESSURE: 100 MMHG | WEIGHT: 126 LBS | HEART RATE: 54 BPM | DIASTOLIC BLOOD PRESSURE: 52 MMHG

## 2019-02-28 VITALS
HEART RATE: 83 BPM | BODY MASS INDEX: 21.66 KG/M2 | DIASTOLIC BLOOD PRESSURE: 70 MMHG | WEIGHT: 130 LBS | SYSTOLIC BLOOD PRESSURE: 110 MMHG | HEIGHT: 65 IN | OXYGEN SATURATION: 97 %

## 2019-02-28 VITALS
WEIGHT: 122 LBS | HEIGHT: 65 IN | OXYGEN SATURATION: 98 % | DIASTOLIC BLOOD PRESSURE: 46 MMHG | HEART RATE: 80 BPM | SYSTOLIC BLOOD PRESSURE: 90 MMHG | BODY MASS INDEX: 20.33 KG/M2

## 2019-02-28 VITALS
HEART RATE: 80 BPM | DIASTOLIC BLOOD PRESSURE: 48 MMHG | OXYGEN SATURATION: 98 % | HEIGHT: 65 IN | SYSTOLIC BLOOD PRESSURE: 84 MMHG | WEIGHT: 125 LBS | BODY MASS INDEX: 20.83 KG/M2

## 2020-02-24 ENCOUNTER — TELEPHONE (OUTPATIENT)
Dept: FAMILY MEDICINE CLINIC | Facility: CLINIC | Age: 75
End: 2020-02-24

## (undated) NOTE — LETTER
September 25, 2017     Nato Ndiaye  [address] [place] [address]      Dear Janee Mock:    Below are the results from your recent visit: results are within normal limits    Resulted Orders   HEMOGLOBIN A1C   Result Value Ref Range    Gly MCHC 32.8 32.0 - 37.0 g/dl    RDW 14.9 11.0 - 15.0 %     140 - 400 K/UL    MPV 8.3 7.4 - 10.3 fL    Neutrophil % 74 %    Lymphocyte % 15 %    Monocyte % 10 %    Eosinophil % 2 %    Basophil % 1 %    Neutrophil Absolute 4.3 1.8 - 7.7 K/UL    Lymphoc